# Patient Record
Sex: MALE | Race: WHITE | NOT HISPANIC OR LATINO | Employment: FULL TIME | URBAN - METROPOLITAN AREA
[De-identification: names, ages, dates, MRNs, and addresses within clinical notes are randomized per-mention and may not be internally consistent; named-entity substitution may affect disease eponyms.]

---

## 2017-08-21 ENCOUNTER — HOSPITAL ENCOUNTER (EMERGENCY)
Facility: HOSPITAL | Age: 72
Discharge: HOME | End: 2017-08-21
Attending: EMERGENCY MEDICINE | Admitting: EMERGENCY MEDICINE

## 2017-08-21 ENCOUNTER — APPOINTMENT (OUTPATIENT)
Dept: CT IMAGING | Facility: HOSPITAL | Age: 72
End: 2017-08-21

## 2017-08-21 ENCOUNTER — APPOINTMENT (OUTPATIENT)
Dept: CARDIOLOGY | Facility: HOSPITAL | Age: 72
End: 2017-08-21

## 2017-08-21 VITALS
OXYGEN SATURATION: 98 % | HEIGHT: 70 IN | TEMPERATURE: 98.7 F | BODY MASS INDEX: 30.49 KG/M2 | DIASTOLIC BLOOD PRESSURE: 64 MMHG | RESPIRATION RATE: 16 BRPM | WEIGHT: 213 LBS | HEART RATE: 72 BPM | SYSTOLIC BLOOD PRESSURE: 128 MMHG

## 2017-08-21 DIAGNOSIS — R60.0 LOCALIZED EDEMA: Primary | ICD-10-CM

## 2017-08-21 LAB
BH CV ECHO MEAS - BSA(HAYCOCK): 2.2 M^2
BH CV ECHO MEAS - BSA: 2.1 M^2
BH CV ECHO MEAS - BZI_BMI: 30.6 KILOGRAMS/M^2
BH CV ECHO MEAS - BZI_METRIC_HEIGHT: 177.8 CM
BH CV ECHO MEAS - BZI_METRIC_WEIGHT: 96.6 KG
BH CV UPPER VENOUS LEFT INTERNAL JUGULAR AUGMENT: NORMAL
BH CV UPPER VENOUS LEFT INTERNAL JUGULAR COMPRESS: NORMAL
BH CV UPPER VENOUS LEFT INTERNAL JUGULAR PHASIC: NORMAL
BH CV UPPER VENOUS LEFT INTERNAL JUGULAR SPONT: NORMAL
BH CV UPPER VENOUS RIGHT AXILLARY AUGMENT: NORMAL
BH CV UPPER VENOUS RIGHT AXILLARY COMPRESS: NORMAL
BH CV UPPER VENOUS RIGHT AXILLARY PHASIC: NORMAL
BH CV UPPER VENOUS RIGHT AXILLARY SPONT: NORMAL
BH CV UPPER VENOUS RIGHT BASILIC FOREARM COMPRESS: NORMAL
BH CV UPPER VENOUS RIGHT BASILIC UPPER COMPRESS: NORMAL
BH CV UPPER VENOUS RIGHT BRACHIAL COMPRESS: NORMAL
BH CV UPPER VENOUS RIGHT CEPHALIC FOREARM COMPRESS: NORMAL
BH CV UPPER VENOUS RIGHT CEPHALIC UPPER COMPRESS: NORMAL
BH CV UPPER VENOUS RIGHT INTERNAL JUGULAR COMPRESS: NORMAL
BH CV UPPER VENOUS RIGHT INTERNAL JUGULAR PHASIC: NORMAL
BH CV UPPER VENOUS RIGHT INTERNAL JUGULAR SPONT: NORMAL
BH CV UPPER VENOUS RIGHT RADIAL COMPRESS: NORMAL
BH CV UPPER VENOUS RIGHT SUBCLAVIAN PHASIC: NORMAL
BH CV UPPER VENOUS RIGHT SUBCLAVIAN SPONT: NORMAL
BH CV UPPER VENOUS RIGHT ULNAR COMPRESS: NORMAL
BUN BLDA-MCNC: 28 MG/DL (ref 8–26)
CA-I BLDA-SCNC: 1.22 MMOL/L (ref 1.2–1.32)
CHLORIDE BLDA-SCNC: 102 MMOL/L (ref 98–109)
CO2 BLDA-SCNC: 29 MMOL/L (ref 24–29)
CREAT BLDA-MCNC: 1.1 MG/DL (ref 0.6–1.3)
GLUCOSE BLDC GLUCOMTR-MCNC: 87 MG/DL (ref 70–130)
HCT VFR BLDA CALC: 39 % (ref 38–51)
HGB BLDA-MCNC: 13.3 G/DL (ref 12–17)
INR PPP: 2.2 (ref 0.8–1.2)
POTASSIUM BLDA-SCNC: 4.5 MMOL/L (ref 3.5–4.9)
PROTHROMBIN TIME: 25.4 SECONDS (ref 12.8–15.2)
SODIUM BLDA-SCNC: 146 MMOL/L (ref 138–146)

## 2017-08-21 PROCEDURE — 71275 CT ANGIOGRAPHY CHEST: CPT

## 2017-08-21 PROCEDURE — 80047 BASIC METABLC PNL IONIZED CA: CPT

## 2017-08-21 PROCEDURE — 85014 HEMATOCRIT: CPT

## 2017-08-21 PROCEDURE — 0 IOPAMIDOL PER 1 ML: Performed by: EMERGENCY MEDICINE

## 2017-08-21 PROCEDURE — 85610 PROTHROMBIN TIME: CPT

## 2017-08-21 PROCEDURE — 99283 EMERGENCY DEPT VISIT LOW MDM: CPT

## 2017-08-21 PROCEDURE — 93971 EXTREMITY STUDY: CPT

## 2017-08-21 RX ORDER — VALACYCLOVIR HYDROCHLORIDE 500 MG/1
500 TABLET, FILM COATED ORAL 2 TIMES DAILY
COMMUNITY

## 2017-08-21 RX ORDER — SODIUM CHLORIDE 0.9 % (FLUSH) 0.9 %
10 SYRINGE (ML) INJECTION AS NEEDED
Status: DISCONTINUED | OUTPATIENT
Start: 2017-08-21 | End: 2017-08-22 | Stop reason: HOSPADM

## 2017-08-21 RX ORDER — CEPHALEXIN 500 MG/1
500 CAPSULE ORAL 4 TIMES DAILY
Qty: 40 CAPSULE | Refills: 0 | Status: SHIPPED | OUTPATIENT
Start: 2017-08-21 | End: 2017-08-31

## 2017-08-21 RX ORDER — DIGOXIN 125 MCG
125 TABLET ORAL
COMMUNITY

## 2017-08-21 RX ORDER — PRAVASTATIN SODIUM 20 MG
60 TABLET ORAL DAILY
COMMUNITY

## 2017-08-21 RX ORDER — FUROSEMIDE 20 MG/1
20 TABLET ORAL DAILY
COMMUNITY

## 2017-08-21 RX ORDER — CEPHALEXIN 250 MG/1
1000 CAPSULE ORAL ONCE
Status: COMPLETED | OUTPATIENT
Start: 2017-08-21 | End: 2017-08-21

## 2017-08-21 RX ORDER — ATENOLOL 50 MG/1
50 TABLET ORAL DAILY
COMMUNITY

## 2017-08-21 RX ADMIN — IOPAMIDOL 95 ML: 755 INJECTION, SOLUTION INTRAVENOUS at 19:46

## 2017-08-21 RX ADMIN — CEPHALEXIN 1000 MG: 250 CAPSULE ORAL at 22:13

## 2017-08-22 NOTE — DISCHARGE INSTRUCTIONS
Follow-up with your doctor as appointed on Wednesday.  Reports the nearest emergency department if vomiting, fever, feeling bad, or worsening swelling in right arm.

## 2017-08-22 NOTE — ED PROVIDER NOTES
Subjective   HPI Comments: Patient complaining of right arm swelling for the past several days.  Patient is from [place] and has been visiting family here and noticed that his right arm has been swollen.  He denies any surgical procedures to this area or the lips system surrounding it.  Patient denies any fever, decrease in appetite, chills or rash.  Patient is currently on Coumadin for atrial fibrillation and states he sees his doctor and less than 2 days for follow-up on this matter.  Patient has no other complaints.    Patient is a 71 y.o. male presenting with upper extremity pain.   History provided by:  Patient and spouse  Upper Extremity Issue   Location:  Arm  Injury: no    Pain details:     Radiates to:  Does not radiate    Severity:  No pain    Onset quality:  Gradual    Timing:  Constant  Handedness:  Right-handed  Relieved by:  Nothing  Worsened by:  Nothing  Associated symptoms: swelling    Associated symptoms: no decreased range of motion, no fever, no muscle weakness, no neck pain and no numbness        Review of Systems   Constitutional: Negative for fever.   Musculoskeletal: Negative for neck pain.   All other systems reviewed and are negative.      Past Medical History:   Diagnosis Date   • Coronary artery disease    • Hodgkin's disease    • Hypertension        No Known Allergies    Past Surgical History:   Procedure Laterality Date   • AORTIC VALVE REPAIR/REPLACEMENT     • CORONARY ARTERY BYPASS GRAFT     • PACEMAKER IMPLANTATION         History reviewed. No pertinent family history.    Social History     Social History   • Marital status:      Spouse name: N/A   • Number of children: N/A   • Years of education: N/A     Social History Main Topics   • Smoking status: Former Smoker   • Smokeless tobacco: None   • Alcohol use No   • Drug use: No   • Sexual activity: Defer     Other Topics Concern   • None     Social History Narrative   • None           Objective   Physical Exam    Constitutional: He appears well-developed and well-nourished.   HENT:   Head: Normocephalic.   Eyes: Conjunctivae are normal.   Cardiovascular: Normal rate, regular rhythm and normal heart sounds.    No murmur heard.  Pulmonary/Chest: Effort normal and breath sounds normal. No respiratory distress. He has no wheezes.   Musculoskeletal:   Edema from mid humerus down to digits.  This area is minimally warmer than the contralateral extremity when compared.  There is no crepitus on palpation.  There does appear to be some pitting edema approximately 2+ to this extremity.  There is no erythema.  Pulses 3+.  Patient has normal range of motion to this extremity area   Neurological: He is alert.   Skin: Skin is warm.   Psychiatric: He has a normal mood and affect. His behavior is normal. Judgment and thought content normal.   Nursing note and vitals reviewed.      Procedures         ED Course  ED Course   Comment By Time   I spoke with Dr. Barker.  Given the fact the patient is afebrile, has normal respiratory rate, has normal heart rate, has felt well and having a normal appetite, the patient does not meet SERS criteria.  We will however, empirically treat him for an early cellulitic process.  Patient has an appointment with his doctor less than 2 days from now.  Until that time, if he starts having fever or signs of toxicity he is to report to the nearest emergency department. ANNIE Galindo 08/21 2207   Doppler ultrasound right upper extremity normal per Meaghan.  Ultrasonographer. ANNIE Galindo 08/21 2218   Patient likely has lymphedema to right upper extremity.  While I don't have his entire medical record, he has a remote history of Hodgkin's lymphoma and states that he can't recall having any lymph nodes removed.  On CT however there were clips noticed in the right upper thoracic area consistent with infraclavicular lymph nodes being resected.I discussed the pancreas findings as well with the patient he states he  will follow this with his doctor but he's had no abdominal discomfort, vomiting, or food intolerances. ANNIE Galindo 08/21 2220          Recent Results (from the past 24 hour(s))   POC CHEM 8    Collection Time: 08/21/17  7:08 PM   Result Value Ref Range    Glucose 87 70 - 130 mg/dL    BUN, Arterial 28 (H) 8 - 26 mg/dL    Creatinine 1.10 0.60 - 1.30 mg/dL    Sodium 146 138 - 146 mmol/L    Potassium 4.5 3.5 - 4.9 mmol/L    Chloride 102 98 - 109 mmol/L    Total CO2 29 24 - 29 mmol/L    Hemoglobin 13.3 12.0 - 17.0 g/dL    Hematocrit 39 38 - 51 %    Ionized Calcium 1.22 1.20 - 1.32 mmol/L   Duplex Venous Upper Extremity - Right CAR    Collection Time: 08/21/17  7:12 PM   Result Value Ref Range    BSA 2.1 m^2     CV ECHO HEATHER - BZI_BMI 30.6 kilograms/m^2     CV ECHO HEATHER - BSA([place]) 2.2 m^2     CV ECHO HEATHER - BZI_METRIC_WEIGHT 96.6 kg     CV ECHO HEATHER - BZI_METRIC_HEIGHT 177.8 cm    Right Internal Jugular Compress C     Right Internal Jugular Phasic Y     Right Internal Jugular Spont Y     Left Internal Jugular Compress C     Left Internal Jugular Phasic Y     Left Internal Jugular Spont Y     Right Subclavian Phasic Y     Right Subclavian Spont Y     Right Axillary Augment Y     Right Axillary Compress C     Right Axillary Phasic Y     Right Axillary Spont Y     Right Brachial Compress C     Right Radial Compress C     Right Ulnar Compress C     Right Basilic Upper Compress C     Right Basilic Forearm Compress C     Right Cephalic Upper Compress C     Right Cephalic Forearm Compress C     Left Internal Jugular Augment Y    POC Protime / INR    Collection Time: 08/21/17  7:13 PM   Result Value Ref Range    Protime 25.4 (H) 12.8 - 15.2 seconds    INR 2.2 (H) 0.8 - 1.2     Note: In addition to lab results from this visit, the labs listed above may include labs taken at another facility or during a different encounter within the last 24 hours. Please correlate lab times with ED admission and discharge times for  further clarification of the services performed during this visit.    CT Angiogram Chest With & Without Contrast   Final Result   Abnormal   Addendum 1 of 1   THIS REPORT CONTAINS FINDINGS THAT MAY BE CRITICAL TO PATIENT CARE. The    findings were verbally communicated via telephone conference with [Minh Degroot] at 9:34 PM EDT on 8/21/2017. The findings were acknowledged and    understood.       NOTE: No PICC line in the RIGHT arm.  Hyperdensities along the wall of the       RIGHT axillary and brachial veins consistent with calcifications extending    up    to surgical clips in the supraclavicular region.  Also no upper abdominal    pain,    peripancreatic fat stranding may be within normal limits for this patient.        Recommend correlation with pancreatic enzymes.   _______________________________________________________         THIS DOCUMENT HAS BEEN ELECTRONICALLY SIGNED BY SETH LANTIGUA MD      Final     Moderate to severe atherosclerotic disease of the aorta with aneurysmal    dilatation of the aortic root without evidence of aortic dissection.        Unremarkable CT angiogram of the RIGHT upper extremity.        Atherosclerotic disease of the abdominal aorta and its branches without    significant narrowing, no aneurysm or dissection.        Chronic cardiopulmonary changes as described without evidence of pneumonia or    pleural effusion.         Cholelithiasis and findings suspicious for acute pancreatitis. Recommend    correlation with liver function tests, pancreatic enzymes and followup as    clinically indicated.         Numerous other nonemergent findings as described.          Subcutaneous soft tissue edema in the RIGHT arm extending to the elbow and    visualized upper foramen without a discrete fluid collection or hematoma.      THIS DOCUMENT HAS BEEN ELECTRONICALLY SIGNED BY SEHT LANTIGUA MD        Vitals:    08/21/17 1711 08/21/17 1858 08/21/17 2016 08/21/17 2204   BP: 164/74  128/64    BP  "Location: Left arm      Patient Position: Sitting      Pulse: 68  72    Resp: 16  16    Temp: 98 °F (36.7 °C)  98.3 °F (36.8 °C) 98.7 °F (37.1 °C)   TempSrc: Oral      SpO2: 98%  98%    Weight: 213 lb (96.6 kg) 213 lb (96.6 kg)     Height: 70\" (177.8 cm) 70\" (177.8 cm)       Medications   sodium chloride 0.9 % flush 10 mL (not administered)   iopamidol (ISOVUE-370) 76 % injection 100 mL (95 mL Intravenous Given 8/21/17 1946)   cephalexin (KEFLEX) capsule 1,000 mg (1,000 mg Oral Given 8/21/17 2213)     ECG/EMG Results (last 24 hours)     ** No results found for the last 24 hours. **              [facility]    Final diagnoses:   Localized edema            ANNIE Galindo  08/21/17 2220    "